# Patient Record
Sex: FEMALE | Race: WHITE | NOT HISPANIC OR LATINO | URBAN - NONMETROPOLITAN AREA
[De-identification: names, ages, dates, MRNs, and addresses within clinical notes are randomized per-mention and may not be internally consistent; named-entity substitution may affect disease eponyms.]

---

## 2020-04-20 NOTE — PATIENT DISCUSSION
TELEMEDICINE EXAM 4/20/20: PT VERBALLING CONSENTED TO TELEHEALTH EXAM TODAY. START TIME 11:02AM-11:12AM PERFORMED FROM [address] TO PATIENT'S CAR.

## 2022-11-21 ENCOUNTER — NEW PATIENT (OUTPATIENT)
Dept: URBAN - NONMETROPOLITAN AREA CLINIC 1 | Facility: CLINIC | Age: 57
End: 2022-11-21

## 2022-11-21 DIAGNOSIS — H52.4: ICD-10-CM

## 2022-11-21 PROCEDURE — 92015 DETERMINE REFRACTIVE STATE: CPT

## 2022-11-21 PROCEDURE — 92004 COMPRE OPH EXAM NEW PT 1/>: CPT

## 2022-11-21 ASSESSMENT — TONOMETRY
OS_IOP_MMHG: 12
OD_IOP_MMHG: 12

## 2022-11-21 ASSESSMENT — VISUAL ACUITY
OD_CC: 20/22-2
OS_CC: 20/20-2

## 2023-11-27 ENCOUNTER — ESTABLISHED PATIENT (OUTPATIENT)
Dept: URBAN - NONMETROPOLITAN AREA CLINIC 1 | Facility: CLINIC | Age: 58
End: 2023-11-27

## 2023-11-27 DIAGNOSIS — H52.4: ICD-10-CM

## 2023-11-27 PROCEDURE — 92015 DETERMINE REFRACTIVE STATE: CPT

## 2023-11-27 PROCEDURE — 92014 COMPRE OPH EXAM EST PT 1/>: CPT

## 2023-11-27 ASSESSMENT — VISUAL ACUITY
OS_CC: 20/20
OD_CC: 20/20
OU_CC: 20/20

## 2023-11-27 ASSESSMENT — TONOMETRY
OS_IOP_MMHG: 16
OD_IOP_MMHG: 16

## 2025-01-27 ENCOUNTER — COMPREHENSIVE EXAM (OUTPATIENT)
Age: 60
End: 2025-01-27

## 2025-01-27 DIAGNOSIS — H52.4: ICD-10-CM

## 2025-01-27 PROCEDURE — 92015 DETERMINE REFRACTIVE STATE: CPT

## 2025-01-27 PROCEDURE — 92014 COMPRE OPH EXAM EST PT 1/>: CPT
